# Patient Record
Sex: MALE | Race: WHITE | NOT HISPANIC OR LATINO | Employment: FULL TIME | URBAN - METROPOLITAN AREA
[De-identification: names, ages, dates, MRNs, and addresses within clinical notes are randomized per-mention and may not be internally consistent; named-entity substitution may affect disease eponyms.]

---

## 2024-01-19 ENCOUNTER — LAB (OUTPATIENT)
Dept: LAB | Facility: LAB | Age: 59
End: 2024-01-19
Payer: COMMERCIAL

## 2024-01-19 ENCOUNTER — OFFICE VISIT (OUTPATIENT)
Dept: ENDOCRINOLOGY | Facility: CLINIC | Age: 59
End: 2024-01-19
Payer: COMMERCIAL

## 2024-01-19 ENCOUNTER — TELEPHONE (OUTPATIENT)
Dept: PRIMARY CARE | Facility: CLINIC | Age: 59
End: 2024-01-19

## 2024-01-19 VITALS
HEIGHT: 73 IN | BODY MASS INDEX: 28.31 KG/M2 | DIASTOLIC BLOOD PRESSURE: 82 MMHG | HEART RATE: 86 BPM | SYSTOLIC BLOOD PRESSURE: 136 MMHG | WEIGHT: 213.6 LBS | RESPIRATION RATE: 16 BRPM

## 2024-01-19 DIAGNOSIS — C73 THYROID CANCER (MULTI): ICD-10-CM

## 2024-01-19 DIAGNOSIS — E03.9 HYPOTHYROIDISM, UNSPECIFIED TYPE: Primary | ICD-10-CM

## 2024-01-19 DIAGNOSIS — E03.9 HYPOTHYROIDISM, UNSPECIFIED TYPE: ICD-10-CM

## 2024-01-19 LAB — TSH SERPL-ACNC: 2.1 MIU/L (ref 0.44–3.98)

## 2024-01-19 PROCEDURE — 1036F TOBACCO NON-USER: CPT | Performed by: INTERNAL MEDICINE

## 2024-01-19 PROCEDURE — 99213 OFFICE O/P EST LOW 20 MIN: CPT | Performed by: INTERNAL MEDICINE

## 2024-01-19 PROCEDURE — 84443 ASSAY THYROID STIM HORMONE: CPT

## 2024-01-19 PROCEDURE — 36415 COLL VENOUS BLD VENIPUNCTURE: CPT

## 2024-01-19 RX ORDER — LEVOTHYROXINE SODIUM 150 UG/1
150 TABLET ORAL DAILY
Qty: 90 TABLET | Refills: 3 | Status: SHIPPED | OUTPATIENT
Start: 2024-01-19 | End: 2025-01-18

## 2024-01-19 RX ORDER — LEVOTHYROXINE SODIUM 150 UG/1
150 TABLET ORAL DAILY
COMMUNITY
End: 2024-01-19 | Stop reason: SDUPTHER

## 2024-01-19 RX ORDER — SILDENAFIL 100 MG/1
100 TABLET, FILM COATED ORAL DAILY PRN
COMMUNITY
Start: 2023-04-18 | End: 2024-04-17

## 2024-01-19 ASSESSMENT — ENCOUNTER SYMPTOMS
DIZZINESS: 0
SHORTNESS OF BREATH: 0
LIGHT-HEADEDNESS: 0
NAUSEA: 0
CHILLS: 0
DIARRHEA: 0
VOMITING: 0
FEVER: 0

## 2024-01-19 NOTE — PROGRESS NOTES
Endocrinology: Follow up visit  Subjective   Patient ID: Henrik Willis is a 58 y.o. male who presents for Hypothyroidism and Thyroid Cancer.    PCP: Marcia Francis, DO    HPI  Since last visit a year ago doing fine.  Exercising.  Feeling well.  Weight down a bit.   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 Surveillance u/s completed for 5 yr     Review of Systems   Constitutional:  Negative for chills and fever.   Respiratory:  Negative for shortness of breath.    Gastrointestinal:  Negative for diarrhea, nausea and vomiting.   Endocrine: Negative for cold intolerance and heat intolerance.   Neurological:  Negative for dizziness and light-headedness.       There is no problem list on file for this patient.       Home Meds:  Current Outpatient Medications   Medication Instructions    levothyroxine (SYNTHROID, LEVOXYL) 150 mcg, oral, Daily    sildenafil (VIAGRA) 100 mg, oral, Daily PRN        Allergies   Allergen Reactions    Advair Diskus [Fluticasone Propion-Salmeterol] Unknown    Codeine Unknown        Objective   Vitals:    01/19/24 1113   BP: 136/82   Pulse: 86   Resp: 16      Vitals:    01/19/24 1113   Weight: 96.9 kg (213 lb 9.6 oz)      Body mass index is 28.18 kg/m².   Physical Exam  Constitutional:       Appearance: Normal appearance. He is overweight.   HENT:      Head: Normocephalic and atraumatic.   Neck:      Thyroid: No thyroid mass, thyromegaly or thyroid tenderness.      Comments: Small remaining gland  Cardiovascular:      Rate and Rhythm: Normal rate and regular rhythm.      Heart sounds: No murmur heard.     No gallop.   Pulmonary:      Effort: Pulmonary effort is normal.      Breath sounds: Normal breath sounds.   Abdominal:      Palpations: Abdomen is soft.      Comments: benign   Neurological:      General: No focal deficit present.       "Mental Status: He is alert and oriented to person, place, and time.      Deep Tendon Reflexes: Reflexes are normal and symmetric.   Psychiatric:         Behavior: Behavior is cooperative.         Labs:  Lab Results   Component Value Date    HGBA1C 5.6 08/30/2022    TSH 1.42 01/20/2023      No results found for: \"PR1\", \"THYROIDPAB\", \"TSI\"     Assessment/Plan   Problem List Items Addressed This Visit    None  Visit Diagnoses       Hypothyroidism, unspecified type    -  Primary    Relevant Orders    TSH with reflex to Free T4 if abnormal    Thyroid cancer (CMS/HCC)            Blood work today  No need for repeat us unless exam changes  Follow up in one year    Electronically signed by:  Josseline Ventura MD 01/19/24 11:57 AM              "

## 2025-01-17 ENCOUNTER — APPOINTMENT (OUTPATIENT)
Dept: ENDOCRINOLOGY | Facility: CLINIC | Age: 60
End: 2025-01-17
Payer: COMMERCIAL

## 2025-01-22 ENCOUNTER — TELEPHONE (OUTPATIENT)
Dept: PRIMARY CARE | Facility: CLINIC | Age: 60
End: 2025-01-22
Payer: COMMERCIAL

## 2025-04-07 ENCOUNTER — TELEPHONE (OUTPATIENT)
Dept: PRIMARY CARE | Facility: CLINIC | Age: 60
End: 2025-04-07
Payer: COMMERCIAL

## 2025-04-07 DIAGNOSIS — E03.9 HYPOTHYROIDISM, UNSPECIFIED TYPE: ICD-10-CM

## 2025-04-07 RX ORDER — LEVOTHYROXINE SODIUM 150 UG/1
150 TABLET ORAL DAILY
Qty: 90 TABLET | Refills: 0 | Status: SHIPPED | OUTPATIENT
Start: 2025-04-07 | End: 2026-04-07

## 2025-07-11 ENCOUNTER — APPOINTMENT (OUTPATIENT)
Facility: CLINIC | Age: 60
End: 2025-07-11
Payer: COMMERCIAL

## 2025-07-11 VITALS
HEIGHT: 73 IN | HEART RATE: 80 BPM | SYSTOLIC BLOOD PRESSURE: 120 MMHG | BODY MASS INDEX: 28.18 KG/M2 | RESPIRATION RATE: 16 BRPM | DIASTOLIC BLOOD PRESSURE: 78 MMHG

## 2025-07-11 DIAGNOSIS — C73 THYROID CANCER (MULTI): ICD-10-CM

## 2025-07-11 DIAGNOSIS — E03.9 HYPOTHYROIDISM, UNSPECIFIED TYPE: Primary | ICD-10-CM

## 2025-07-11 PROBLEM — L71.9 ROSACEA: Status: ACTIVE | Noted: 2021-09-11

## 2025-07-11 PROBLEM — E04.9 GOITER, NON-TOXIC: Status: ACTIVE | Noted: 2020-09-29

## 2025-07-11 PROBLEM — C44.321 SQUAMOUS CELL CARCINOMA OF NOSE: Status: ACTIVE | Noted: 2021-09-11

## 2025-07-11 PROCEDURE — 1036F TOBACCO NON-USER: CPT | Performed by: INTERNAL MEDICINE

## 2025-07-11 PROCEDURE — 99213 OFFICE O/P EST LOW 20 MIN: CPT | Performed by: INTERNAL MEDICINE

## 2025-07-11 RX ORDER — LEVOTHYROXINE SODIUM 150 UG/1
150 TABLET ORAL DAILY
Qty: 90 TABLET | Refills: 3 | Status: SHIPPED | OUTPATIENT
Start: 2025-07-11 | End: 2026-07-11

## 2025-07-11 ASSESSMENT — ENCOUNTER SYMPTOMS
DEPRESSION: 0
DIZZINESS: 0
LOSS OF SENSATION IN FEET: 0
SHORTNESS OF BREATH: 0
DIARRHEA: 0
LIGHT-HEADEDNESS: 0
VOMITING: 0
CHILLS: 0
NAUSEA: 0
OCCASIONAL FEELINGS OF UNSTEADINESS: 0
FEVER: 0

## 2025-07-11 ASSESSMENT — PATIENT HEALTH QUESTIONNAIRE - PHQ9
SUM OF ALL RESPONSES TO PHQ9 QUESTIONS 1 AND 2: 0
2. FEELING DOWN, DEPRESSED OR HOPELESS: NOT AT ALL
1. LITTLE INTEREST OR PLEASURE IN DOING THINGS: NOT AT ALL

## 2025-07-11 ASSESSMENT — PAIN SCALES - GENERAL: PAINLEVEL_OUTOF10: 0-NO PAIN

## 2025-07-11 NOTE — PROGRESS NOTES
Endocrinology: Follow up visit  Subjective   Patient ID: Henrik Willis is a 59 y.o. male who presents for Hypothyroidism and Thyroid Cancer.    PCP: Marcia Francis DO    HPI  Last seen 1/2024  Doing well this year  Taking t4 as directed  Tsh 5/2025 1.9  Diagnosed in 2015 and had a hemithyroidectomy which showed a right 3.5 cm papillary thyroid cancer follicular variant with capsular but no lymphatic invasion. Surveillance u/s completed for 5 yr   Review of Systems   Constitutional:  Negative for chills and fever.   Respiratory:  Negative for shortness of breath.    Gastrointestinal:  Negative for diarrhea, nausea and vomiting.   Endocrine: Negative for cold intolerance and heat intolerance.   Neurological:  Negative for dizziness and light-headedness.       Problem List[1]     Home Meds:  Current Outpatient Medications   Medication Instructions    levothyroxine (SYNTHROID, LEVOXYL) 150 mcg, oral, Daily    sildenafil (VIAGRA) 100 mg, oral, Daily PRN        RX Allergies[2]     Objective   Vitals:    07/11/25 1451   BP: 120/78   Pulse: 80   Resp: 16      Vitals:      Body mass index is 28.18 kg/m².   Physical Exam  Constitutional:       Appearance: Normal appearance.   HENT:      Head: Normocephalic and atraumatic.   Neck:      Thyroid: No thyroid mass, thyromegaly or thyroid tenderness.      Comments: No palpable thyroid gland  Cardiovascular:      Rate and Rhythm: Normal rate and regular rhythm.      Heart sounds: No murmur heard.     No gallop.   Pulmonary:      Effort: Pulmonary effort is normal.      Breath sounds: Normal breath sounds.   Abdominal:      Palpations: Abdomen is soft.      Comments: benign   Neurological:      General: No focal deficit present.      Mental Status: He is alert and oriented to person, place, and time.      Deep Tendon Reflexes: Reflexes are normal and symmetric.   Psychiatric:         Behavior: Behavior is cooperative.         Labs:  Lab Results   Component Value Date     "HGBA1C 5.6 08/30/2022    TSH 2.10 01/19/2024      No results found for: \"PR1\", \"THYROIDPAB\", \"TSI\"     Assessment/Plan   Assessment & Plan  Hypothyroidism, unspecified type  Tsh normal  Continue current med dose  Orders:    levothyroxine (Synthroid, Levoxyl) 150 mcg tablet; Take 1 tablet (150 mcg) by mouth once daily.    Thyroid cancer (Multi)    Neck exam ok, no need for repeat us unless changes      Electronically signed by:  Josseline Ventura MD 07/11/25 3:11 PM                   [1]   Patient Active Problem List  Diagnosis    Acquired hypothyroidism    Hypothyroidism    Goiter, non-toxic    History of thyroid cancer    Rosacea    Squamous cell carcinoma of nose    Malignant neoplasm of thyroid gland (Multi)    Thyroid cancer (Multi)   [2]   Allergies  Allergen Reactions    Advair Diskus [Fluticasone Propion-Salmeterol] Unknown    Codeine Unknown     "

## 2025-07-11 NOTE — ASSESSMENT & PLAN NOTE
Tsh normal  Continue current med dose  Orders:    levothyroxine (Synthroid, Levoxyl) 150 mcg tablet; Take 1 tablet (150 mcg) by mouth once daily.

## 2026-07-10 ENCOUNTER — APPOINTMENT (OUTPATIENT)
Facility: CLINIC | Age: 61
End: 2026-07-10
Payer: COMMERCIAL